# Patient Record
Sex: FEMALE | Race: BLACK OR AFRICAN AMERICAN | NOT HISPANIC OR LATINO | Employment: UNEMPLOYED | URBAN - METROPOLITAN AREA
[De-identification: names, ages, dates, MRNs, and addresses within clinical notes are randomized per-mention and may not be internally consistent; named-entity substitution may affect disease eponyms.]

---

## 2022-01-22 ENCOUNTER — HOSPITAL ENCOUNTER (EMERGENCY)
Facility: HOSPITAL | Age: 32
Discharge: HOME | End: 2022-01-22
Attending: EMERGENCY MEDICINE

## 2022-01-22 VITALS
DIASTOLIC BLOOD PRESSURE: 84 MMHG | OXYGEN SATURATION: 100 % | WEIGHT: 293 LBS | SYSTOLIC BLOOD PRESSURE: 124 MMHG | BODY MASS INDEX: 51.91 KG/M2 | RESPIRATION RATE: 18 BRPM | HEART RATE: 102 BPM | HEIGHT: 63 IN | TEMPERATURE: 97 F
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
## 2022-01-22 DIAGNOSIS — S20.212A RIB CONTUSION, LEFT, INITIAL ENCOUNTER: Primary | ICD-10-CM

## 2022-01-22 LAB
B-HCG UR QL: NEGATIVE
CTP QC/QA: YES

## 2022-01-22 PROCEDURE — 63600175 PHARM REV CODE 636 W HCPCS: Performed by: NURSE PRACTITIONER

## 2022-01-22 PROCEDURE — 99284 EMERGENCY DEPT VISIT MOD MDM: CPT | Mod: 25

## 2022-01-22 PROCEDURE — 96374 THER/PROPH/DIAG INJ IV PUSH: CPT

## 2022-01-22 PROCEDURE — 25000003 PHARM REV CODE 250: Performed by: NURSE PRACTITIONER

## 2022-01-22 PROCEDURE — 81025 URINE PREGNANCY TEST: CPT | Performed by: NURSE PRACTITIONER

## 2022-01-22 RX ORDER — ONDANSETRON 4 MG/1
4 TABLET, ORALLY DISINTEGRATING ORAL
Status: COMPLETED | OUTPATIENT
Start: 2022-01-22 | End: 2022-01-22

## 2022-01-22 RX ORDER — HYDROCODONE BITARTRATE AND ACETAMINOPHEN 5; 325 MG/1; MG/1
1 TABLET ORAL EVERY 6 HOURS PRN
Qty: 12 TABLET | Refills: 0 | Status: SHIPPED | OUTPATIENT
Start: 2022-01-22

## 2022-01-22 RX ORDER — MORPHINE SULFATE 4 MG/ML
6 INJECTION, SOLUTION INTRAMUSCULAR; INTRAVENOUS
Status: COMPLETED | OUTPATIENT
Start: 2022-01-22 | End: 2022-01-22

## 2022-01-22 RX ADMIN — MORPHINE SULFATE 6 MG: 4 INJECTION, SOLUTION INTRAMUSCULAR; INTRAVENOUS at 01:01

## 2022-01-22 RX ADMIN — ONDANSETRON 4 MG: 4 TABLET, ORALLY DISINTEGRATING ORAL at 01:01

## 2022-01-22 NOTE — ED PROVIDER NOTES
Encounter Date: 1/22/2022       History     Chief Complaint   Patient presents with    rin injury     Patient reports falling of a 5 ft ladder at the top and landing on her left side on concrete. Patient reports pain to left ribs. Denies sob.     31-year-old female presents the emergency department after falling approximately 5 feet from a ladder.  She reports it was a mechanical fall.  She has not hit her head or lose consciousness.  She denies numbness or tingling x4 extremities.  Denies bowel or bladder incontinence.  Pain is left lateral under the left breast towards the left upper back.  She states this is the side on which she fell onto the concrete.  She denies other injuries.  Pain is exacerbated by deep breath or cough or movement.    The history is provided by the patient. No  was used.     Review of patient's allergies indicates:   Allergen Reactions    Penicillins     Toradol [ketorolac]      History reviewed. No pertinent past medical history.  History reviewed. No pertinent surgical history.  History reviewed. No pertinent family history.  Social History     Tobacco Use    Smoking status: Never Smoker    Smokeless tobacco: Never Used   Substance Use Topics    Alcohol use: Never     Review of Systems   Constitutional: Negative for chills, fatigue and fever.   HENT: Negative for congestion, ear discharge, ear pain, postnasal drip, rhinorrhea, sinus pressure, sneezing, sore throat and voice change.    Eyes: Negative for discharge and itching.   Respiratory: Negative for cough, shortness of breath and wheezing.         Left rib pain   Cardiovascular: Negative for chest pain, palpitations and leg swelling.   Gastrointestinal: Negative for abdominal pain, constipation, diarrhea, nausea and vomiting.   Endocrine: Negative for polydipsia, polyphagia and polyuria.   Genitourinary: Negative for dysuria, frequency, hematuria, urgency, vaginal bleeding, vaginal discharge and vaginal pain.    Musculoskeletal: Negative for arthralgias and myalgias.   Skin: Negative for rash and wound.   Neurological: Negative for dizziness, seizures, syncope, weakness and numbness.   Hematological: Negative for adenopathy. Does not bruise/bleed easily.   Psychiatric/Behavioral: Negative for self-injury and suicidal ideas. The patient is not nervous/anxious.        Physical Exam     Initial Vitals [01/22/22 1251]   BP Pulse Resp Temp SpO2   (!) 153/83 (!) 114 18 98.7 °F (37.1 °C) 100 %      MAP       --         Physical Exam    Nursing note and vitals reviewed.  Constitutional: She appears well-developed and well-nourished. She is Obese .   HENT:   Head: Normocephalic and atraumatic.   Right Ear: External ear normal.   Left Ear: External ear normal.   Nose: Nose normal.   Eyes: Conjunctivae and EOM are normal. Pupils are equal, round, and reactive to light. Right eye exhibits no discharge. Left eye exhibits no discharge.   Neck:   Normal range of motion.  Cardiovascular: Regular rhythm, S1 normal, S2 normal and normal heart sounds. Exam reveals no gallop.    No murmur heard.  Pulmonary/Chest: Effort normal and breath sounds normal. No respiratory distress. She has no decreased breath sounds. She has no wheezes. She has no rhonchi. She has no rales.   Abdominal: She exhibits no distension.   Musculoskeletal:         General: Normal range of motion.      Cervical back: Normal range of motion.      Comments: Spine is without tenderness or step-offs.  Patient is grossly neurologically intact.  There is tenderness to the entire left lateral area     Neurological: She is alert and oriented to person, place, and time.   Skin: Skin is dry. Capillary refill takes less than 2 seconds.         ED Course   Procedures  Labs Reviewed   POCT URINE PREGNANCY          Imaging Results          X-Ray Ribs 2 View Left (Final result)  Result time 01/22/22 13:27:44    Final result by Amelie Teran MD (01/22/22 13:27:44)                  Impression:      Limited exam.  Within this limitation, no left rib fracture.      Electronically signed by: Amelie Teran  Date:    01/22/2022  Time:    13:27             Narrative:    EXAMINATION:  XR RIBS 2 VIEW LEFT    CLINICAL HISTORY:  Unspecified fall, initial encounter    TECHNIQUE:  Two views of the left ribs were performed.    COMPARISON:  None.    FINDINGS:  This examination is limited due to the patient's body habitus.  Within this limitation, no rib fractures identified.  The visualized lungs are clear.  No soft tissue abnormality.                                 Medications   morphine injection 6 mg (6 mg Intravenous Given 1/22/22 1330)   ondansetron disintegrating tablet 4 mg (4 mg Oral Given 1/22/22 1330)     Medical Decision Making:   Initial Assessment:   31-year-old female who fell onto her left lateral side while climbing a ladder.  Differential Diagnosis:   Rib fracture, rib contusion, pneumothorax, hemothorax, distracting injury  Clinical Tests:   Lab Tests: Ordered and Reviewed  Radiological Study: Ordered and Reviewed             ED Course as of 01/22/22 1934   Sat Jan 22, 2022   1305 BP(!): 153/83 [VC]   1305 Temp: 98.7 °F (37.1 °C) [VC]   1305 Temp src: Oral [VC]   1305 Pulse(!): 114 [VC]   1305 Resp: 18 [VC]   1305 SpO2: 100 % [VC]   1336 X-Ray Ribs 2 View Left       Limited exam.  Within this limitation, no left rib fracture. [VC]   1336 Resp: 16 [VC]   1359 Preg Test, Ur: Negative [VC]   1359 BP(!): 122/90 [VC]   1359 Temp: 99.4 °F (37.4 °C) [VC]   1359 Pulse: 99 [VC]   1359 Resp: 18 [VC]   1359 SpO2: 99 % [VC]      ED Course User Index  [VC] Tanner Gautam DNP          x-ray indicated no rib fracture.  The patient is discharged with instructions for rib contusion and pain medication with drowsy warning.  She should follow up as directed.  See AVS for additional recommendations. Medications listed below were prescribed after reviewing the patient's allergies, medication list,  history, most recent laboratories as available.  Referrals below were provided after reviewing the patient's previous medical providers. She understands she  should return for any worsening or changes in condition.  Prior to discharge the patient was asked if she  had any additional concerns or complaints and she declined. The patient was given an opportunity to ask questions and all were answered to her satisfaction.    Medications given in the ER During this Visit:  Medications   morphine injection 6 mg (6 mg Intravenous Given 1/22/22 1330)   ondansetron disintegrating tablet 4 mg (4 mg Oral Given 1/22/22 1330)         Clinical Impression:   Final diagnoses:  [W19.XXXA] Fall  [S20.212A] Rib contusion, left, initial encounter (Primary)          ED Disposition Condition    Discharge Stable        ED Prescriptions     Medication Sig Dispense Start Date End Date Auth. Provider    HYDROcodone-acetaminophen (NORCO) 5-325 mg per tablet Take 1 tablet by mouth every 6 (six) hours as needed for Pain. 12 tablet 1/22/2022  Tanner Gautam DNP        Follow-up Information     Follow up With Specialties Details Why Contact Info    [facility] [place]  Schedule an appointment as soon as possible for a visit   [address] [place] [address]  [phone]             Tanner Gautam DNP  01/22/22 1932

## 2022-01-22 NOTE — Clinical Note
"Jenny Junior" Lesley was seen and treated in our emergency department on 1/22/2022.  She may return to work on 01/24/2022.       If you have any questions or concerns, please don't hesitate to call.      Tanner Gautam DNP"

## 2022-01-22 NOTE — ED NOTES
Pt. reports her  is supposed to pick her up however he is at the dealership and not able to get her until he is finished. Pt. Made aware she received a narcotic and told staff her  was going to pick her up. Pt. Made aware she will have to wait 4 hours to be d/c

## 2022-01-22 NOTE — DISCHARGE INSTRUCTIONS
You have been given a medication that causes drowsiness.  Do not operate motor vehicles, drink alcohol, or operate heavy machinery while taking this medication. Return to the Emergency Department for any worsening, change in condition, or any emergent concerns.

## 2022-01-24 ENCOUNTER — HOSPITAL ENCOUNTER (EMERGENCY)
Facility: HOSPITAL | Age: 32
Discharge: HOME | End: 2022-01-24
Attending: EMERGENCY MEDICINE

## 2022-01-24 VITALS
HEIGHT: 63 IN | HEART RATE: 101 BPM | DIASTOLIC BLOOD PRESSURE: 89 MMHG | TEMPERATURE: 99 F | WEIGHT: 293 LBS | OXYGEN SATURATION: 100 % | RESPIRATION RATE: 20 BRPM | SYSTOLIC BLOOD PRESSURE: 156 MMHG | BODY MASS INDEX: 51.91 KG/M2
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
## 2022-01-24 DIAGNOSIS — R07.1: ICD-10-CM

## 2022-01-24 DIAGNOSIS — S20.212D RIB CONTUSION, LEFT, SUBSEQUENT ENCOUNTER: Primary | ICD-10-CM

## 2022-01-24 LAB
B-HCG UR QL: NEGATIVE
CTP QC/QA: YES

## 2022-01-24 PROCEDURE — 94799 UNLISTED PULMONARY SVC/PX: CPT

## 2022-01-24 PROCEDURE — 99900035 HC TECH TIME PER 15 MIN (STAT)

## 2022-01-24 PROCEDURE — 99284 EMERGENCY DEPT VISIT MOD MDM: CPT | Mod: 25

## 2022-01-24 PROCEDURE — 96372 THER/PROPH/DIAG INJ SC/IM: CPT

## 2022-01-24 PROCEDURE — 63600175 PHARM REV CODE 636 W HCPCS: Performed by: NURSE PRACTITIONER

## 2022-01-24 PROCEDURE — 81025 URINE PREGNANCY TEST: CPT | Performed by: EMERGENCY MEDICINE

## 2022-01-24 RX ORDER — DEXAMETHASONE SODIUM PHOSPHATE 4 MG/ML
12 INJECTION, SOLUTION INTRA-ARTICULAR; INTRALESIONAL; INTRAMUSCULAR; INTRAVENOUS; SOFT TISSUE
Status: COMPLETED | OUTPATIENT
Start: 2022-01-24 | End: 2022-01-24

## 2022-01-24 RX ORDER — TIZANIDINE 4 MG/1
4 TABLET ORAL EVERY 8 HOURS PRN
Qty: 20 TABLET | Refills: 0 | OUTPATIENT
Start: 2022-01-24 | End: 2023-05-26

## 2022-01-24 RX ORDER — HYDROCODONE BITARTRATE AND ACETAMINOPHEN 5; 325 MG/1; MG/1
1 TABLET ORAL EVERY 4 HOURS PRN
Qty: 8 TABLET | Refills: 0 | Status: SHIPPED | OUTPATIENT
Start: 2022-01-24

## 2022-01-24 RX ADMIN — DEXAMETHASONE SODIUM PHOSPHATE 12 MG: 4 INJECTION INTRA-ARTICULAR; INTRALESIONAL; INTRAMUSCULAR; INTRAVENOUS; SOFT TISSUE at 01:01

## 2022-01-24 NOTE — ED PROVIDER NOTES
"Encounter Date: 1/24/2022    SCRIBE #1 NOTE: I, Jacquie Dave, am scribing for, and in the presence of,  Abhay Baugh NP. I have scribed the following portions of the note - Other sections scribed: HPI, ROS.       History     Chief Complaint   Patient presents with    Rib Pain     Pt to ER with c/o left sided rib pain. Pt seen here on Saturday and diagnosed with rib contusion. 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 female, with no past medical history, who presents to the ED today with a complaint of left sided rib pain that radiates to her back. The patient states that she fell off a ladder 2 days ago and came to this ED on 1/22/22 to evaluate the pain in her ribs. She also states that when she was in this ED she was given X-rays and a morphine injection. She reports taking muscle relaxers and using a Lidocaine patch prior to arrival with no relief. She notes that her pain is worsened with deep inspiration. She also notes that her pain worsened today when she was "lifting up on patients." She denies chest pain or other associated symptoms. No other complaints at this time.    The history is provided by the patient.     Review of patient's allergies indicates:   Allergen Reactions    Penicillins     Toradol [ketorolac]      No past medical history on file.  No past surgical history on file.  No family history on file.  Social History     Tobacco Use    Smoking status: Never Smoker    Smokeless tobacco: Never Used   Substance Use Topics    Alcohol use: Never     Review of Systems   Constitutional: Negative for chills, fatigue and fever.   HENT: Negative for congestion, ear pain, nosebleeds, postnasal drip, rhinorrhea, sinus pressure and sore throat.    Eyes: Negative for photophobia and pain.   Respiratory: Negative for apnea, cough, choking, chest tightness, shortness of breath, wheezing and stridor.    Cardiovascular: Negative for " chest pain, palpitations and leg swelling.   Gastrointestinal: Negative for abdominal pain, constipation, diarrhea, nausea and vomiting.   Genitourinary: Negative for dysuria, flank pain, hematuria, pelvic pain and vaginal discharge.   Musculoskeletal: Positive for back pain. Negative for arthralgias, gait problem and neck pain.        Positive for rib pain (left sided).   Skin: Negative for color change, pallor, rash and wound.   Neurological: Negative for dizziness, seizures, syncope, facial asymmetry, light-headedness and headaches.   Hematological: Negative for adenopathy.   Psychiatric/Behavioral: Negative for confusion. The patient is not nervous/anxious.        Physical Exam     Initial Vitals [01/24/22 1207]   BP Pulse Resp Temp SpO2   (!) 155/85 105 18 99.6 °F (37.6 °C) 100 %      MAP       --         Physical Exam    Nursing note and vitals reviewed.  Constitutional: She appears well-developed and well-nourished. She is not diaphoretic. No distress.   HENT:   Head: Normocephalic and atraumatic.   Right Ear: External ear normal.   Left Ear: External ear normal.   Nose: Nose normal.   Eyes: Conjunctivae and EOM are normal. Right eye exhibits no discharge. Left eye exhibits no discharge.   Neck: Neck supple. No tracheal deviation present.   Normal range of motion.  Cardiovascular: Normal rate.   Pulmonary/Chest: Effort normal and breath sounds normal. No accessory muscle usage or stridor. No tachypnea. No respiratory distress.   Respiratory effort is normal.  Chest rise even and symmetrical.  Lungs clear bilaterally in all fields   Abdominal: Abdomen is soft. She exhibits no distension. There is no abdominal tenderness.   Musculoskeletal:         General: No tenderness. Normal range of motion.      Cervical back: Normal range of motion and neck supple.      Comments: Generalized tenderness overlying the left ribs and to the left thoracic back.  No deformity, bruising, swelling, bony step-off, crepitus, or  "other abnormalities     Neurological: She is alert and oriented to person, place, and time. She has normal strength. No cranial nerve deficit or sensory deficit.   Skin: Skin is warm and dry.   Psychiatric: She has a normal mood and affect. Her behavior is normal. Judgment and thought content normal.         ED Course   Procedures  Labs Reviewed   POCT URINE PREGNANCY          Imaging Results          X-Ray Chest PA And Lateral (Edited Result - FINAL)  Result time 01/24/22 13:53:34    Addendum 1 of 1 by Jamie Carrasco MD (01/24/22 13:53:34)      Correction, only 3 views of the left ribs were obtained on today's exam.  This exam was compared to the prior study on 01/22/2022.      Electronically signed by: Jamie Carrasco MD  Date:    01/24/2022  Time:    13:53               Final result by Jamie Carrasco MD (01/24/22 13:49:31)                 Impression:      No displaced left-sided rib fracture or associated complication identified in the chest.      Electronically signed by: Jamie Carrasco MD  Date:    01/24/2022  Time:    13:49             Narrative:    EXAMINATION:  XR CHEST PA AND LATERAL; XR RIBS 2 VIEW LEFT    CLINICAL HISTORY:  Provided history is "  Fall on and from ladder, initial encounter".    TECHNIQUE:  Frontal and lateral views of the chest were performed.  Five views of the left ribs also obtained.    COMPARISON:  None.    FINDINGS:  Chest radiograph: Cardiac silhouette is not enlarged. No focal consolidation.  No sizable pleural effusion.  No pneumothorax.    Left ribs: No displaced left-sided rib fracture.  No distinct pneumothorax.  No subcutaneous emphysema in the left lateral chest wall.    Surgical clips overlie the upper abdomen suggestive of prior cholecystectomy.                               X-Ray Ribs 2 View Left (Edited Result - FINAL)  Result time 01/24/22 13:53:34    Addendum 2 of 2 by Jamie Carrasco MD (01/24/22 13:53:34)      Correction, only 3 views of the left ribs were " "obtained on today's exam.  This exam was compared to the prior study on 01/22/2022.      Electronically signed by: Jamie Carrasco MD  Date:    01/24/2022  Time:    13:53               Addendum 1 of 2 by Jamie Carrasco MD (01/24/22 13:53:34)      Correction, only 3 views of the left ribs were obtained on today's exam.  This exam was compared to the prior study on 01/22/2022.      Electronically signed by: Jamie Carrasco MD  Date:    01/24/2022  Time:    13:53               Final result by Jamie Carrasco MD (01/24/22 13:49:31)                 Impression:      No displaced left-sided rib fracture or associated complication identified in the chest.      Electronically signed by: Jamie Carrasco MD  Date:    01/24/2022  Time:    13:49             Narrative:    EXAMINATION:  XR CHEST PA AND LATERAL; XR RIBS 2 VIEW LEFT    CLINICAL HISTORY:  Provided history is "  Fall on and from ladder, initial encounter".    TECHNIQUE:  Frontal and lateral views of the chest were performed.  Five views of the left ribs also obtained.    COMPARISON:  None.    FINDINGS:  Chest radiograph: Cardiac silhouette is not enlarged. No focal consolidation.  No sizable pleural effusion.  No pneumothorax.    Left ribs: No displaced left-sided rib fracture.  No distinct pneumothorax.  No subcutaneous emphysema in the left lateral chest wall.    Surgical clips overlie the upper abdomen suggestive of prior cholecystectomy.                               X-Ray Thoracic Spine AP Lateral (Final result)  Result time 01/24/22 13:54:00    Final result by Jamie Carrasco MD (01/24/22 13:54:00)                 Impression:      No convincing acute abnormality identified in the thoracic spine, allowing for limitations.      Electronically signed by: Jamie Carrasco MD  Date:    01/24/2022  Time:    13:54             Narrative:    EXAMINATION:  XR THORACIC SPINE AP LATERAL    CLINICAL HISTORY:  Fall on and from ladder, initial " encounter    TECHNIQUE:  AP and lateral views of the thoracic spine were performed.    COMPARISON:  None    FINDINGS:  Normal curvature and alignment.  Visualized vertebral body heights are relatively well maintained, noting that the upper thoracic spine is attenuated by overlapping of the patient's shoulders.  The lower thoracic spine is under penetrated.  No convincing acute displaced fracture identified.                                 Medications   dexamethasone injection 12 mg (12 mg Intramuscular Given 1/24/22 1344)     Medical Decision Making:   History:   Old Medical Records: I decided to obtain old medical records.  Differential Diagnosis:   Fracture, dislocation, pneumothorax, sprain, contusion, spasm, pneumonia, others  Clinical Tests:   Lab Tests: Ordered and Reviewed  The following lab test(s) were unremarkable: UPT  Radiological Study: Ordered and Reviewed  ED Management:  HPI and physical exam as above.    X-rays without evidence of concerning acute abnormality.  Patient given incentive spirometer and instructed on its use by a RT.  No evidence of emergent pathology.  Will treat with NSAIDs and muscle relaxers. Advised patient to follow up with her PCP for re-evaluation and further management.  ED return precautions given. All questions regarding diagnosis and plan were answered to the patient's fullest possible satisfaction. Patient expressed understanding of diagnosis, discharge instructions, and return precautions.            Patient note was created using [employer] voice dictation software.  Any errors in syntax or information may not have been identified and edited prior to signing this note.            Scribe Attestation:   Scribe #1: I performed the above scribed service and the documentation accurately describes the services I performed. I attest to the accuracy of the note.                 Clinical Impression:   Final diagnoses:  [W11.XXXA] Fall from ladder  [S20.212D] Rib contusion, left,  subsequent encounter (Primary)  [R07.1] Painful respiratory movement          ED Disposition Condition    Discharge Stable        ED Prescriptions     Medication Sig Dispense Start Date End Date Auth. Provider    HYDROcodone-acetaminophen (NORCO) 5-325 mg per tablet Take 1 tablet by mouth every 4 (four) hours as needed for Pain. 8 tablet 1/24/2022  Abhay Baugh NP    tiZANidine (ZANAFLEX) 4 MG tablet Take 1 tablet (4 mg total) by mouth every 8 (eight) hours as needed (Muscle Spasms). 20 tablet 1/24/2022  Abhay Baugh NP        Follow-up Information     Follow up With Specialties Details Why Contact Info    [facility]  Schedule an appointment as soon as possible for a visit in 1 week For further evaluation [address] [address]  [phone]      [facility] Emergency Dept Emergency Medicine Go to  If symptoms worsen, As needed [address] [facility] [address] [phone]         I, Abhay Baugh NP, personally performed the services described in this documentation. All medical record entries made by the scribe were at my direction and in my presence. I have reviewed the chart and agree that the record reflects my personal performance and is accurate and complete.     Abhay Baugh NP  01/25/22 3028

## 2022-01-24 NOTE — Clinical Note
"Jenny Junior" Lesley was seen and treated in our emergency department on 1/24/2022.  She may return to work on 01/28/2022.       If you have any questions or concerns, please don't hesitate to call.      Abhay Baugh NP"

## 2022-01-24 NOTE — DISCHARGE INSTRUCTIONS

## 2022-01-24 NOTE — ED TRIAGE NOTES
Patient c/o left side rib pain..Pt stated, she was seen & treated in the ED on Saturday, but symptoms has gotten worse. Hx-HTN

## 2023-05-19 ENCOUNTER — HOSPITAL ENCOUNTER (EMERGENCY)
Facility: HOSPITAL | Age: 33
Discharge: HOME | End: 2023-05-19
Attending: EMERGENCY MEDICINE

## 2023-05-19 VITALS
HEART RATE: 94 BPM | RESPIRATION RATE: 18 BRPM | TEMPERATURE: 98 F | WEIGHT: 293 LBS | HEIGHT: 63 IN | BODY MASS INDEX: 51.91 KG/M2 | SYSTOLIC BLOOD PRESSURE: 143 MMHG | DIASTOLIC BLOOD PRESSURE: 76 MMHG | OXYGEN SATURATION: 100 %

## 2023-05-19 DIAGNOSIS — R07.81 RIB PAIN ON RIGHT SIDE: ICD-10-CM

## 2023-05-19 DIAGNOSIS — S20.211A RIB CONTUSION, RIGHT, INITIAL ENCOUNTER: Primary | ICD-10-CM

## 2023-05-19 LAB
B-HCG UR QL: NEGATIVE
CTP QC/QA: YES

## 2023-05-19 PROCEDURE — 99283 EMERGENCY DEPT VISIT LOW MDM: CPT | Mod: 25

## 2023-05-19 PROCEDURE — 99900035 HC TECH TIME PER 15 MIN (STAT)

## 2023-05-19 PROCEDURE — 25000003 PHARM REV CODE 250: Performed by: NURSE PRACTITIONER

## 2023-05-19 PROCEDURE — 81025 URINE PREGNANCY TEST: CPT | Performed by: NURSE PRACTITIONER

## 2023-05-19 RX ORDER — TRAMADOL HYDROCHLORIDE 50 MG/1
50 TABLET ORAL EVERY 4 HOURS PRN
Qty: 8 TABLET | Refills: 0 | OUTPATIENT
Start: 2023-05-19 | End: 2023-05-26

## 2023-05-19 RX ORDER — OXYCODONE AND ACETAMINOPHEN 5; 325 MG/1; MG/1
1 TABLET ORAL
Status: COMPLETED | OUTPATIENT
Start: 2023-05-19 | End: 2023-05-19

## 2023-05-19 RX ADMIN — OXYCODONE AND ACETAMINOPHEN 1 TABLET: 5; 325 TABLET ORAL at 12:05

## 2023-05-19 NOTE — Clinical Note
"Jenny Junior" Lesley was seen and treated in our emergency department on 5/19/2023.  She may return to work on 05/23/2023.       If you have any questions or concerns, please don't hesitate to call.      Abhay Baugh NP"

## 2023-05-19 NOTE — ED NOTES
Jenny Sutton, a 32 y.o. female presents to the ED w/ complaint of right rib pain (6/10) s/p slip and fall in bath tub. Pt states pain is exacerbated by movement and inhaletion. Pt denies head trauma, LOC, or any other s/s. Pt reports taking four 500mg tylenol 2hrs ago PTA with minimal relief. Pt is AAOX4, VSS, and NADN.     Triage note:  Chief Complaint   Patient presents with    Rib Injury     Pt states she was getting out of the tub when she slipped and landed on right side of ribcage. Pt reports pain on inhalation. Pt denies head injury or trauma.      Review of patient's allergies indicates:   Allergen Reactions    Penicillins     Toradol [ketorolac]      No past medical history on file.

## 2023-05-19 NOTE — DISCHARGE INSTRUCTIONS

## 2023-05-19 NOTE — ED PROVIDER NOTES
Encounter Date: 5/19/2023    SCRIBE #1 NOTE: I, Nicci Guajardo, am scribing for, and in the presence of,  Abhay Baugh NP. I have scribed the following portions of the note - Other sections scribed: HPI, ROS.     History     Chief Complaint   Patient presents with    Rib Injury     Pt states she was getting out of the tub when she slipped and landed on right side of ribcage. Pt reports pain on inhalation. Pt denies head injury or trauma.      Jenny Sutton is a 32 y.o. female, with no pertinent past medical history, who presents to the ED with right sided rib pain that began after slipping while getting out of the shower today. Patient reports pain on inhalation and when lifting. Patient reports taking 4 extra strength tylenol PTA. Patient denies head injury. Patient denies loss of consciousness, neck pain, back pain, or other associated symptoms.      The history is provided by the patient. No  was used.   Review of patient's allergies indicates:   Allergen Reactions    Penicillins     Toradol [ketorolac]      No past medical history on file.  No past surgical history on file.  No family history on file.  Social History     Tobacco Use    Smoking status: Never    Smokeless tobacco: Never   Substance Use Topics    Alcohol use: Never     Review of Systems   Constitutional:  Negative for chills, fatigue and fever.   HENT:  Negative for congestion, ear pain, nosebleeds, postnasal drip, rhinorrhea, sinus pressure and sore throat.    Eyes:  Negative for photophobia and pain.   Respiratory:  Negative for apnea, cough, choking, chest tightness, shortness of breath, wheezing and stridor.    Cardiovascular:  Negative for chest pain, palpitations and leg swelling.   Gastrointestinal:  Negative for abdominal pain, constipation, diarrhea, nausea and vomiting.   Genitourinary:  Negative for dysuria, flank pain, hematuria, pelvic pain and vaginal discharge.   Musculoskeletal:  Negative for arthralgias,  back pain, gait problem and neck pain.        (+) right sided rib pain     Skin:  Negative for color change, pallor, rash and wound.   Neurological:  Negative for dizziness, seizures, syncope, facial asymmetry, light-headedness and headaches.   Hematological:  Negative for adenopathy.   Psychiatric/Behavioral:  Negative for confusion. The patient is not nervous/anxious.      Physical Exam     Initial Vitals [05/19/23 1032]   BP Pulse Resp Temp SpO2   (!) 189/90 (!) 119 19 98.1 °F (36.7 °C) 97 %      MAP       --         Physical Exam    Nursing note and vitals reviewed.  Constitutional: She appears well-developed and well-nourished. She is not diaphoretic. No distress.   HENT:   Head: Normocephalic and atraumatic.   Right Ear: External ear normal.   Left Ear: External ear normal.   Nose: Nose normal.   Eyes: Conjunctivae and EOM are normal. Right eye exhibits no discharge. Left eye exhibits no discharge.   Neck: Neck supple. No tracheal deviation present.   Normal range of motion.  Cardiovascular:  Normal rate.           Pulmonary/Chest: No stridor. No respiratory distress.   Abdominal: Abdomen is soft. She exhibits no distension. There is no abdominal tenderness.   Musculoskeletal:         General: Tenderness present.      Cervical back: Normal range of motion and neck supple.      Comments: Generalized tenderness over the right rib area.  No appreciable deformity or bony step-off, however exam is limited by body habitus.  Chest rise even and symmetrical with respirations.  No crepitus.     Neurological: She is alert and oriented to person, place, and time. She has normal strength. No cranial nerve deficit or sensory deficit.   Skin: Skin is warm and dry.   Psychiatric: She has a normal mood and affect. Her behavior is normal. Judgment and thought content normal.       ED Course   Procedures  Labs Reviewed   POCT URINE PREGNANCY          Imaging Results              X-Ray Ribs 2 View Right (Final result)  Result  time 05/19/23 12:12:04      Final result by Sachin Barraza MD (05/19/23 12:12:04)                   Impression:      No evidence of acute cardiopulmonary disease.    No displaced rib fracture identified.  CT would provide a more sensitive assessment if warranted clinically.      Electronically signed by: Sachin Barraza MD  Date:    05/19/2023  Time:    12:12               Narrative:    EXAMINATION:  XR CHEST PA AND LATERAL; XR RIBS 2 VIEW RIGHT    CLINICAL HISTORY:  right rib injury; Pleurodynia    TECHNIQUE:  PA and lateral views of the chest were performed.    Two views of the right ribs.    COMPARISON:  01/24/2022    FINDINGS:  The cardiomediastinal silhouette is normal in size and midline. Pulmonary vascularity appears within normal limits.    The lungs appear clear without confluent pulmonary parenchymal opacity. No pleural fluid.    Osseous structures appear intact.  No displaced rib fracture identified.    Cholecystectomy clips.                                       X-Ray Chest PA And Lateral (Final result)  Result time 05/19/23 12:12:04      Final result by Sachin Barraza MD (05/19/23 12:12:04)                   Impression:      No evidence of acute cardiopulmonary disease.    No displaced rib fracture identified.  CT would provide a more sensitive assessment if warranted clinically.      Electronically signed by: Sachin Barraza MD  Date:    05/19/2023  Time:    12:12               Narrative:    EXAMINATION:  XR CHEST PA AND LATERAL; XR RIBS 2 VIEW RIGHT    CLINICAL HISTORY:  right rib injury; Pleurodynia    TECHNIQUE:  PA and lateral views of the chest were performed.    Two views of the right ribs.    COMPARISON:  01/24/2022    FINDINGS:  The cardiomediastinal silhouette is normal in size and midline. Pulmonary vascularity appears within normal limits.    The lungs appear clear without confluent pulmonary parenchymal opacity. No pleural fluid.    Osseous structures appear intact.  No displaced rib  fracture identified.    Cholecystectomy clips.                                       Medications   oxyCODONE-acetaminophen 5-325 mg per tablet 1 tablet (1 tablet Oral Given 5/19/23 1211)     Medical Decision Making:   History:   Old Medical Records: I decided to obtain old medical records.  Clinical Tests:   Radiological Study: Ordered and Reviewed  ED Management:  HPI and physical exam as above.  No evidence of rib fracture, pneumothorax, or other abnormality on x-ray.  Symptoms likely due to bruised ribs.  Will provide with incentive spirometer.  Will treat with a short course of pain medications.  Patient educated on expected course of the condition.  Follow up with PCP as needed.  ED return precautions given.  Patient expressed understanding of diagnosis and discharge instructions.        Scribe Attestation:   Scribe #1: I performed the above scribed service and the documentation accurately describes the services I performed. I attest to the accuracy of the note.                 I, Abhay Baugh NP, personally performed the services described in this documentation. All medical record entries made by the scribe were at my direction and in my presence. I have reviewed the chart and agree that the record reflects my personal performance and is accurate and complete.    Clinical Impression:   Final diagnoses:  [R07.81] Rib pain on right side  [S20.211A] Rib contusion, right, initial encounter (Primary)        ED Disposition Condition    Discharge Stable          ED Prescriptions       Medication Sig Dispense Start Date End Date Auth. Provider    traMADoL (ULTRAM) 50 mg tablet Take 1 tablet (50 mg total) by mouth every 4 (four) hours as needed for Pain. 8 tablet 5/19/2023 -- Abhay Baugh NP          Follow-up Information       Follow up With Specialties Details Why Contact Info    [facility]  Schedule an appointment as soon as possible for a visit in 1 week For further evaluation [address]  [facility] [address]  [phone]      [facility] - Emergency Dept Emergency Medicine Go to  If symptoms worsen, As needed [address]  [facility] [address] [phone]             Abhay Baugh, JOHN  05/19/23 1300

## 2023-05-26 ENCOUNTER — HOSPITAL ENCOUNTER (EMERGENCY)
Facility: HOSPITAL | Age: 33
Discharge: HOME | End: 2023-05-26
Attending: EMERGENCY MEDICINE

## 2023-05-26 VITALS
WEIGHT: 293 LBS | RESPIRATION RATE: 20 BRPM | OXYGEN SATURATION: 97 % | DIASTOLIC BLOOD PRESSURE: 94 MMHG | SYSTOLIC BLOOD PRESSURE: 151 MMHG | TEMPERATURE: 99 F | HEART RATE: 98 BPM | BODY MASS INDEX: 52.79 KG/M2

## 2023-05-26 DIAGNOSIS — R07.81 RIB PAIN ON RIGHT SIDE: Primary | ICD-10-CM

## 2023-05-26 LAB
B-HCG UR QL: NEGATIVE
CTP QC/QA: YES

## 2023-05-26 PROCEDURE — 99284 EMERGENCY DEPT VISIT MOD MDM: CPT | Mod: 25

## 2023-05-26 PROCEDURE — 81025 URINE PREGNANCY TEST: CPT | Performed by: EMERGENCY MEDICINE

## 2023-05-26 PROCEDURE — 25000003 PHARM REV CODE 250: Performed by: EMERGENCY MEDICINE

## 2023-05-26 RX ORDER — TIZANIDINE 4 MG/1
4 TABLET ORAL EVERY 6 HOURS PRN
Qty: 20 TABLET | Refills: 0 | Status: SHIPPED | OUTPATIENT
Start: 2023-05-26 | End: 2023-06-05

## 2023-05-26 RX ORDER — TRAMADOL HYDROCHLORIDE 50 MG/1
100 TABLET ORAL
Status: COMPLETED | OUTPATIENT
Start: 2023-05-26 | End: 2023-05-26

## 2023-05-26 RX ORDER — TRAMADOL HYDROCHLORIDE 100 MG/1
100 TABLET, COATED ORAL EVERY 6 HOURS PRN
Qty: 12 TABLET | Refills: 0 | Status: SHIPPED | OUTPATIENT
Start: 2023-05-26 | End: 2023-05-29

## 2023-05-26 RX ADMIN — TRAMADOL HYDROCHLORIDE 100 MG: 50 TABLET, COATED ORAL at 11:05

## 2023-05-26 NOTE — ED NOTES
Pt fell in bathtub 1 week ago hitting right side ribs. Pt presents today w/ c/o of no improvement to right rib pain.  No obvious trauma. Denies sob.  Pt is AAOx3, resp even and unlabored, skin warm and dry.  NAD noted. Call bell within reach.

## 2023-05-26 NOTE — ED PROVIDER NOTES
Encounter Date: 5/26/2023    SCRIBE #1 NOTE: I, Daja Duran, am scribing for, and in the presence of,  Ramez Munoz MD. I have scribed the following portions of the note - Other sections scribed: HPI, ROS, PE, MDM.     History     Chief Complaint   Patient presents with    Rib Injury     Pt reports rib contusion dx last week.  Pt is not getting any better.  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       CC: right-sided rib pain    HPI: This is a 32 y.o.female patient, with no pertinent PMHx, presenting to the ED for further evaluation of right-sided rib pain beginning last week s/p injury. Patient states pain worsens with deep breathing and movement. Patient states she was seen here in the ED and was prescribed Tramadol. Patient reports relief with Tramadol. Patient states she also took Tylenol to help alleviate the symptoms. Patient denies any fever, chills, shortness of breath, chest pain, or any other associated symptoms.    The history is provided by the patient. No  was used.   Review of patient's allergies indicates:   Allergen Reactions    Penicillins     Toradol [ketorolac]      No past medical history on file.  No past surgical history on file.  No family history on file.  Social History     Tobacco Use    Smoking status: Never    Smokeless tobacco: Never   Substance Use Topics    Alcohol use: Never     Review of Systems   Constitutional:  Negative for fever.   HENT:  Negative for sore throat.    Eyes:  Negative for visual disturbance.   Respiratory:  Negative for shortness of breath.    Cardiovascular:  Negative for chest pain.   Gastrointestinal:  Negative for abdominal pain.   Genitourinary:  Negative for difficulty urinating.   Musculoskeletal:  Positive for arthralgias (right-sided rib pain). Negative for back pain.   Skin:  Negative for rash.   Neurological:  Negative for headaches.     Physical Exam     Initial Vitals [05/26/23 1017]   BP Pulse Resp  Temp SpO2   (!) 174/79 (!) 118 (!) 22 98.4 °F (36.9 °C) 96 %      MAP       --         Physical Exam    Nursing note and vitals reviewed.  Constitutional: She appears well-developed and well-nourished.   Eyes: EOM are normal. Pupils are equal, round, and reactive to light.   Neck: Neck supple. No thyromegaly present. No JVD present.   Normal range of motion.  Cardiovascular:  Normal rate, regular rhythm, normal heart sounds and intact distal pulses.     Exam reveals no gallop and no friction rub.       No murmur heard.  Pulmonary/Chest: Breath sounds normal. No respiratory distress.   Abdominal: Abdomen is soft. Bowel sounds are normal.   Musculoskeletal:         General: No edema. Normal range of motion.      Cervical back: Normal range of motion and neck supple.      Comments: Isolated right lateral rib tenderness to 6th-7th rib and mid axillary line.     Neurological: She is alert and oriented to person, place, and time. She has normal strength.   Skin: Skin is warm and dry.       ED Course   Procedures  Labs Reviewed   POCT URINE PREGNANCY          Imaging Results              CT Chest Without Contrast (Final result)  Result time 05/26/23 12:28:13      Final result by David Smith MD (05/26/23 12:28:13)                   Impression:      1. No findings to suggest acute solid organ injury of the chest or upper abdomen.  2. Scattered pulmonary nodules as described.  For multiple solid nodules all <6 mm, Fleischner Society 2017 guidelines recommend no routine follow up for a low risk patient, or follow up with non-contrast chest CT at 12 months after discovery in a high risk patient.  3. Possible hepatic steatosis, correlation with LFTs recommended.  4. Please see above for additional findings.      Electronically signed by: David Smith MD  Date:    05/26/2023  Time:    12:28               Narrative:    EXAMINATION:  CT CHEST WITHOUT CONTRAST    CLINICAL HISTORY:  Chest trauma, blunt;    TECHNIQUE:  Low  dose axial images, sagittal and coronal reformations were obtained from the thoracic inlet to the lung bases. Contrast was not administered.    COMPARISON:  Chest and rib radiographs 05/19/2023    FINDINGS:  The structures at the base of the neck are unremarkable.  No significant mediastinal lymphadenopathy.  The heart is not enlarged.  The thoracic aorta tapers normally.  The esophagus is unremarkable.  The visualized portions of the kidneys, adrenal glands, spleen, pancreas and liver are grossly unremarkable noting there may be hepatic steatosis.  Correlation with LFTs recommended.  The gallbladder is surgically absent.  There is a minimal hiatal hernia.    Motion artifact limits evaluation of the airways and pulmonary parenchyma.  Allowing for this, the airways the are patent.  No pneumothorax.  No pleural effusion.  No large focal consolidation.  There is a 2 mm pulmonary nodule within the posterior aspect of the right lower lobe, series 2, image 52.  There is a pulmonary nodule within the posterior aspect of the right lower lobe, series 2, image 47, measuring 3 mm.  There is a 2 mm pulmonary nodule within the anterior aspect of the left upper lobe, series 2, image 40.  There is a 3 mm pulmonary nodule within the posterior aspect of the left lower lobe, series 2, image 62.    There are degenerative changes of the spine.  No significant vertebral body height loss.  The facet joints are aligned.  The sternum is intact.  No acute displaced rib fracture.  There are a few scattered nonenlarged bilateral inguinal lymph nodes.                                       Medications   traMADoL tablet 100 mg (100 mg Oral Given 5/26/23 1130)     Medical Decision Making:   History:   Old Medical Records: I decided to obtain old medical records.  Old Records Summarized: other records.       <> Summary of Records: External documents reviewed.  Initial Assessment:   This is an emergent evaluation of a 32 y.o. female who presents  with right-sided rib pain beginning last week s/p injury. The patient was seen and examined. The history and physical exam was obtained. The nursing notes and vital signs were reviewed. Secondary to symptoms and examination findings, I ordered CT chest.  Clinical Tests:   Lab Tests: Reviewed and Ordered  Radiological Study: Ordered and Reviewed  ED Management:  Shared decision making with patient.       CT shows no acute traumatic abnormalities. Incidentally shows multiple pulmonary nodules and fatty liver. Pt notified of these. Referred to primary care and hepatology. Also given numbers for orthopedics and pain management if needed. PT had similar injury on the left that took 7 months to heal per chart review.      Scribe Attestation:   Scribe #1: I performed the above scribed service and the documentation accurately describes the services I performed. I attest to the accuracy of the note.                   Clinical Impression:   Final diagnoses:  [R07.81] Rib pain on right side (Primary)        ED Disposition Condition    Discharge Stable          ED Prescriptions       Medication Sig Dispense Start Date End Date Auth. Provider    traMADoL 100 mg Tab Take 100 mg by mouth every 6 (six) hours as needed. 12 tablet 5/26/2023 5/29/2023 Ramez Munoz MD    tiZANidine (ZANAFLEX) 4 MG tablet Take 1 tablet (4 mg total) by mouth every 6 (six) hours as needed (muscle spasms). 20 tablet 5/26/2023 6/5/2023 Ramez Munoz MD          Follow-up Information       Follow up With Specialties Details Why Contact Info    [facility]  Schedule an appointment as soon as possible for a visit  or your primary care [address] [facility] [address]  [phone]      Zuly Gorman NP Hepatology Schedule an appointment as soon as possible for a visit  for further evaluation of fatty liver. [address]  [facility] [address]  [phone]      Matthew Chao Jr., MD Pain Medicine Schedule  an appointment as soon as possible for a visit in 2 weeks As needed if pain does not resolve. [address]  [facility] [address]  [phone]      Dmitriy Melo MD Orthopedic Surgery Schedule an appointment as soon as possible for a visit  for rib pain if primary care can not see you in a timely manner [address]  [address]  [place] [address]  [phone]              I, Ramez Munoz, personally performed the services described in this documentation. All medical record entries made by the scribe were at my direction and in my presence. I have reviewed the chart and agree that the record reflects my personal performance and is accurate and complete.       Ramez Munoz MD  05/26/23 3852